# Patient Record
Sex: FEMALE | Race: WHITE | Employment: FULL TIME | URBAN - METROPOLITAN AREA
[De-identification: names, ages, dates, MRNs, and addresses within clinical notes are randomized per-mention and may not be internally consistent; named-entity substitution may affect disease eponyms.]

---

## 2017-01-25 RX ORDER — ACYCLOVIR 400 MG/1
TABLET ORAL
Qty: 60 TAB | Refills: 5 | Status: SHIPPED | OUTPATIENT
Start: 2017-01-25 | End: 2018-01-15 | Stop reason: SDUPTHER

## 2017-06-09 RX ORDER — ESTRADIOL 1 MG/1
TABLET ORAL
Qty: 30 TAB | Refills: 5 | Status: SHIPPED | OUTPATIENT
Start: 2017-06-09 | End: 2017-12-12 | Stop reason: SDUPTHER

## 2017-08-09 RX ORDER — GABAPENTIN 100 MG/1
CAPSULE ORAL
Qty: 60 CAP | Refills: 5 | Status: SHIPPED | OUTPATIENT
Start: 2017-08-09 | End: 2018-02-02 | Stop reason: ALTCHOICE

## 2017-09-14 RX ORDER — SIMVASTATIN 40 MG/1
TABLET, FILM COATED ORAL
Qty: 30 TAB | Refills: 5 | Status: SHIPPED | OUTPATIENT
Start: 2017-09-14 | End: 2018-11-19 | Stop reason: SDUPTHER

## 2017-12-12 RX ORDER — ESTRADIOL 1 MG/1
TABLET ORAL
Qty: 30 TAB | Refills: 5 | Status: SHIPPED | OUTPATIENT
Start: 2017-12-12 | End: 2018-03-11 | Stop reason: ALTCHOICE

## 2018-01-15 RX ORDER — ACYCLOVIR 400 MG/1
TABLET ORAL
Qty: 60 TAB | Refills: 5 | Status: SHIPPED | OUTPATIENT
Start: 2018-01-15 | End: 2019-01-22 | Stop reason: SDUPTHER

## 2018-01-26 ENCOUNTER — HOSPITAL ENCOUNTER (OUTPATIENT)
Dept: LAB | Age: 53
Discharge: HOME | End: 2018-01-26
Payer: COMMERCIAL

## 2018-01-26 ENCOUNTER — OFFICE VISIT (OUTPATIENT)
Dept: INTERNAL MEDICINE CLINIC | Age: 53
End: 2018-01-26

## 2018-01-26 VITALS
HEIGHT: 67 IN | RESPIRATION RATE: 16 BRPM | BODY MASS INDEX: 35.94 KG/M2 | WEIGHT: 229 LBS | SYSTOLIC BLOOD PRESSURE: 132 MMHG | TEMPERATURE: 98.4 F | HEART RATE: 76 BPM | DIASTOLIC BLOOD PRESSURE: 82 MMHG | OXYGEN SATURATION: 99 %

## 2018-01-26 DIAGNOSIS — Z00.00 ROUTINE CHECK-UP: Primary | ICD-10-CM

## 2018-01-26 DIAGNOSIS — E04.9 ENLARGED THYROID: ICD-10-CM

## 2018-01-26 DIAGNOSIS — R07.2 PRECORDIAL PAIN: ICD-10-CM

## 2018-01-26 DIAGNOSIS — R20.2 PARESTHESIA OF FOOT, BILATERAL: ICD-10-CM

## 2018-01-26 LAB
ALBUMIN SERPL-MCNC: 3.7 G/DL (ref 3.4–5)
ALBUMIN/GLOB SERPL: 0.9 {RATIO} (ref 0.8–1.7)
ALP SERPL-CCNC: 91 U/L (ref 45–117)
ALT SERPL-CCNC: 17 U/L (ref 13–56)
ANION GAP SERPL CALC-SCNC: 10 MMOL/L (ref 3–18)
AST SERPL-CCNC: 15 U/L (ref 15–37)
BASOPHILS # BLD: 0 K/UL (ref 0–0.06)
BASOPHILS NFR BLD: 0 % (ref 0–2)
BILIRUB SERPL-MCNC: 0.2 MG/DL (ref 0.2–1)
BUN SERPL-MCNC: 14 MG/DL (ref 7–18)
BUN/CREAT SERPL: 19 (ref 12–20)
CALCIUM SERPL-MCNC: 9.1 MG/DL (ref 8.5–10.1)
CHLORIDE SERPL-SCNC: 105 MMOL/L (ref 100–108)
CO2 SERPL-SCNC: 27 MMOL/L (ref 21–32)
CREAT SERPL-MCNC: 0.73 MG/DL (ref 0.6–1.3)
D DIMER PPP FEU-MCNC: 0.29 UG/ML(FEU)
DIFFERENTIAL METHOD BLD: NORMAL
EOSINOPHIL # BLD: 0.1 K/UL (ref 0–0.4)
EOSINOPHIL NFR BLD: 1 % (ref 0–5)
ERYTHROCYTE [DISTWIDTH] IN BLOOD BY AUTOMATED COUNT: 12.7 % (ref 11.6–14.5)
GLOBULIN SER CALC-MCNC: 3.9 G/DL (ref 2–4)
GLUCOSE SERPL-MCNC: 95 MG/DL (ref 74–99)
HCT VFR BLD AUTO: 42.6 % (ref 35–45)
HGB BLD-MCNC: 13.9 G/DL (ref 12–16)
LYMPHOCYTES # BLD: 2.8 K/UL (ref 0.9–3.6)
LYMPHOCYTES NFR BLD: 32 % (ref 21–52)
MCH RBC QN AUTO: 29.3 PG (ref 24–34)
MCHC RBC AUTO-ENTMCNC: 32.6 G/DL (ref 31–37)
MCV RBC AUTO: 89.9 FL (ref 74–97)
MONOCYTES # BLD: 0.4 K/UL (ref 0.05–1.2)
MONOCYTES NFR BLD: 4 % (ref 3–10)
NEUTS SEG # BLD: 5.3 K/UL (ref 1.8–8)
NEUTS SEG NFR BLD: 63 % (ref 40–73)
PLATELET # BLD AUTO: 384 K/UL (ref 135–420)
PMV BLD AUTO: 10.7 FL (ref 9.2–11.8)
POTASSIUM SERPL-SCNC: 4.8 MMOL/L (ref 3.5–5.5)
PROT SERPL-MCNC: 7.6 G/DL (ref 6.4–8.2)
RBC # BLD AUTO: 4.74 M/UL (ref 4.2–5.3)
SODIUM SERPL-SCNC: 142 MMOL/L (ref 136–145)
TSH SERPL DL<=0.05 MIU/L-ACNC: 2.06 UIU/ML (ref 0.36–3.74)
WBC # BLD AUTO: 8.6 K/UL (ref 4.6–13.2)

## 2018-01-26 PROCEDURE — 85025 COMPLETE CBC W/AUTO DIFF WBC: CPT | Performed by: INTERNAL MEDICINE

## 2018-01-26 PROCEDURE — 84443 ASSAY THYROID STIM HORMONE: CPT | Performed by: INTERNAL MEDICINE

## 2018-01-26 PROCEDURE — 85379 FIBRIN DEGRADATION QUANT: CPT | Performed by: INTERNAL MEDICINE

## 2018-01-26 PROCEDURE — 80053 COMPREHEN METABOLIC PANEL: CPT | Performed by: INTERNAL MEDICINE

## 2018-01-26 RX ORDER — CETIRIZINE HCL 10 MG
TABLET ORAL
COMMUNITY

## 2018-01-26 RX ORDER — PSEUDOEPHEDRINE HCL 30 MG
TABLET ORAL
COMMUNITY
End: 2018-03-11 | Stop reason: ALTCHOICE

## 2018-01-26 RX ORDER — PANTOPRAZOLE SODIUM 40 MG/1
TABLET, DELAYED RELEASE ORAL
Qty: 30 TAB | Refills: 1 | Status: SHIPPED | OUTPATIENT
Start: 2018-01-26 | End: 2018-03-09 | Stop reason: SDUPTHER

## 2018-01-26 RX ORDER — PREDNISONE 20 MG/1
TABLET ORAL
Qty: 20 TAB | Refills: 0 | Status: SHIPPED | OUTPATIENT
Start: 2018-01-26 | End: 2018-03-11 | Stop reason: ALTCHOICE

## 2018-01-26 NOTE — PATIENT INSTRUCTIONS
Health Maintenance Due   Topic    Hepatitis C Screening     DTaP/Tdap/Td series (1 - Tdap)    PAP AKA CERVICAL CYTOLOGY     FOBT Q 1 YEAR AGE 54-65     Influenza Age 5 to Adult     BREAST CANCER SCRN MAMMOGRAM

## 2018-01-26 NOTE — PROGRESS NOTES
The patient presents to the office today with the chief complaint of chest tightness    HPI    The patient complains of months of intermittent chest tightness. The patient relates tightness around her chest.  The patient also notes epigastric - lower chest pain. Stabbing in nature. Symptoms are not exertional.  The problem has been intermittent . Over time the pain has been lasting longer. The patient notes episodes of wheezing. A recent chest xray was reported to the patient as being negative. The patient complains of tingling in both feet. This has been present for 4 months. The patient's dentist has noticed what he felt was an enlarged thyroid      Review of Systems   Respiratory: Negative for shortness of breath. Cardiovascular: Negative for chest pain and leg swelling. No Known Allergies    Current Outpatient Prescriptions   Medication Sig Dispense Refill    cetirizine (ZYRTEC) 10 mg tablet Take  by mouth.  pseudoephedrine (SUDAFED) 30 mg tablet Take  by mouth every four (4) hours as needed for Congestion.  pantoprazole (PROTONIX) 40 mg tablet 1 tablet each AM 30 Tab 1    predniSONE (DELTASONE) 20 mg tablet 3 tabs daily x 3 days, 2 tabs daily x 3 days, 1 tab daily x 3 days, 1/2 tab daily x 3 days 20 Tab 0    acyclovir (ZOVIRAX) 400 mg tablet TAKE 1 TABLET BY MOUTH 2 TIMES A DAY 60 Tab 5    estradiol (ESTRACE) 1 mg tablet TAKE 1 TABLET BY MOUTH ONCE DAILY 30 Tab 5    simvastatin (ZOCOR) 40 mg tablet TAKE 1 TABLET BY MOUTH ONCE DAILY 30 Tab 5    gabapentin (NEURONTIN) 100 mg capsule TAKE 1-2 CAPSULES BY MOUTH EVERY NIGHT AT BEDTIME 60 Cap 5    albuterol (PROVENTIL HFA, VENTOLIN HFA, PROAIR HFA) 90 mcg/actuation inhaler Take 2 Puffs by inhalation every four (4) hours as needed for Wheezing.  1 Inhaler 0       Past Medical History:   Diagnosis Date    Allergic rhinitis, cause unspecified        Past Surgical History:   Procedure Laterality Date    HX HYSTERECTOMY         Social History     Social History    Marital status: SINGLE     Spouse name: N/A    Number of children: N/A    Years of education: N/A     Occupational History    Not on file. Social History Main Topics    Smoking status: Never Smoker    Smokeless tobacco: Never Used    Alcohol use Not on file    Drug use: No    Sexual activity: Yes     Partners: Male     Other Topics Concern    Not on file     Social History Narrative       Patient does not have an advanced directive on file    Visit Vitals    /82 (BP 1 Location: Left arm, BP Patient Position: Sitting)    Pulse 76    Temp 98.4 °F (36.9 °C) (Tympanic)    Resp 16    Ht 5' 7\" (1.702 m)    Wt 229 lb (103.9 kg)    SpO2 99%    BMI 35.87 kg/m2       Physical Exam   No Cervical Lymphadenopathy  No Supraclavicular Lymphadenopathy  Thyroid is Normal  Breasts are normal to inspection and palpation  Lungs are normal to percussion. Clear to auscultation   Heart:  S1 S2 are normal, No gallops, No mummers, no rubs  No Carotid Bruits  Abdomen:  Normal Bowel Sounds. No tenderness. No masses. No Hepatomegaly or Splenomegly  LE:  Strong Pedal Pulses. No Edema      BMI:  BMI is high but it was not addressed during this visit due to an acute problem      Hospital Outpatient Visit on 01/26/2018   Component Date Value Ref Range Status    D DIMER 01/26/2018 0.29  <0.46 ug/ml(FEU) Final    Comment: (NOTE)  A D-Dimer result less than 0.5 ug/mL FEU combined with a low clinical   pretest probability of DVT and/or PE has a negative predictive value   of %. The positive predictive value is 50% or less.       WBC 01/26/2018 8.6  4.6 - 13.2 K/uL Final    RBC 01/26/2018 4.74  4.20 - 5.30 M/uL Final    HGB 01/26/2018 13.9  12.0 - 16.0 g/dL Final    HCT 01/26/2018 42.6  35.0 - 45.0 % Final    MCV 01/26/2018 89.9  74.0 - 97.0 FL Final    MCH 01/26/2018 29.3  24.0 - 34.0 PG Final    MCHC 01/26/2018 32.6  31.0 - 37.0 g/dL Final    RDW 01/26/2018 12.7  11.6 - 14.5 % Final    PLATELET 91/96/3352 168  135 - 420 K/uL Final    MPV 01/26/2018 10.7  9.2 - 11.8 FL Final    NEUTROPHILS 01/26/2018 63  40 - 73 % Final    LYMPHOCYTES 01/26/2018 32  21 - 52 % Final    MONOCYTES 01/26/2018 4  3 - 10 % Final    EOSINOPHILS 01/26/2018 1  0 - 5 % Final    BASOPHILS 01/26/2018 0  0 - 2 % Final    ABS. NEUTROPHILS 01/26/2018 5.3  1.8 - 8.0 K/UL Final    ABS. LYMPHOCYTES 01/26/2018 2.8  0.9 - 3.6 K/UL Final    ABS. MONOCYTES 01/26/2018 0.4  0.05 - 1.2 K/UL Final    ABS. EOSINOPHILS 01/26/2018 0.1  0.0 - 0.4 K/UL Final    ABS. BASOPHILS 01/26/2018 0.0  0.0 - 0.06 K/UL Final    DF 01/26/2018 AUTOMATED    Final    Sodium 01/26/2018 142  136 - 145 mmol/L Final    Potassium 01/26/2018 4.8  3.5 - 5.5 mmol/L Final    Chloride 01/26/2018 105  100 - 108 mmol/L Final    CO2 01/26/2018 27  21 - 32 mmol/L Final    Anion gap 01/26/2018 10  3.0 - 18 mmol/L Final    Glucose 01/26/2018 95  74 - 99 mg/dL Final    BUN 01/26/2018 14  7.0 - 18 MG/DL Final    Creatinine 01/26/2018 0.73  0.6 - 1.3 MG/DL Final    BUN/Creatinine ratio 01/26/2018 19  12 - 20   Final    GFR est AA 01/26/2018 >60  >60 ml/min/1.73m2 Final    GFR est non-AA 01/26/2018 >60  >60 ml/min/1.73m2 Final    Comment: (NOTE)  Estimated GFR is calculated using the Modification of Diet in Renal   Disease (MDRD) Study equation, reported for both  Americans   (GFRAA) and non- Americans (GFRNA), and normalized to 1.73m2   body surface area. The physician must decide which value applies to   the patient. The MDRD study equation should only be used in   individuals age 25 or older. It has not been validated for the   following: pregnant women, patients with serious comorbid conditions,   or on certain medications, or persons with extremes of body size,   muscle mass, or nutritional status.       Calcium 01/26/2018 9.1  8.5 - 10.1 MG/DL Final    Bilirubin, total 01/26/2018 0.2  0.2 - 1.0 MG/DL Final    ALT (SGPT) 01/26/2018 17  13 - 56 U/L Final    AST (SGOT) 01/26/2018 15  15 - 37 U/L Final    Alk. phosphatase 01/26/2018 91  45 - 117 U/L Final    Protein, total 01/26/2018 7.6  6.4 - 8.2 g/dL Final    Albumin 01/26/2018 3.7  3.4 - 5.0 g/dL Final    Globulin 01/26/2018 3.9  2.0 - 4.0 g/dL Final    A-G Ratio 01/26/2018 0.9  0.8 - 1.7   Final    TSH 01/26/2018 2.06  0.36 - 3.74 uIU/mL Final       .  Results for orders placed or performed during the hospital encounter of 01/26/18   D DIMER   Result Value Ref Range    D DIMER 0.29 <0.46 ug/ml(FEU)   CBC WITH AUTOMATED DIFF   Result Value Ref Range    WBC 8.6 4.6 - 13.2 K/uL    RBC 4.74 4.20 - 5.30 M/uL    HGB 13.9 12.0 - 16.0 g/dL    HCT 42.6 35.0 - 45.0 %    MCV 89.9 74.0 - 97.0 FL    MCH 29.3 24.0 - 34.0 PG    MCHC 32.6 31.0 - 37.0 g/dL    RDW 12.7 11.6 - 14.5 %    PLATELET 807 857 - 445 K/uL    MPV 10.7 9.2 - 11.8 FL    NEUTROPHILS 63 40 - 73 %    LYMPHOCYTES 32 21 - 52 %    MONOCYTES 4 3 - 10 %    EOSINOPHILS 1 0 - 5 %    BASOPHILS 0 0 - 2 %    ABS. NEUTROPHILS 5.3 1.8 - 8.0 K/UL    ABS. LYMPHOCYTES 2.8 0.9 - 3.6 K/UL    ABS. MONOCYTES 0.4 0.05 - 1.2 K/UL    ABS. EOSINOPHILS 0.1 0.0 - 0.4 K/UL    ABS. BASOPHILS 0.0 0.0 - 0.06 K/UL    DF AUTOMATED     METABOLIC PANEL, COMPREHENSIVE   Result Value Ref Range    Sodium 142 136 - 145 mmol/L    Potassium 4.8 3.5 - 5.5 mmol/L    Chloride 105 100 - 108 mmol/L    CO2 27 21 - 32 mmol/L    Anion gap 10 3.0 - 18 mmol/L    Glucose 95 74 - 99 mg/dL    BUN 14 7.0 - 18 MG/DL    Creatinine 0.73 0.6 - 1.3 MG/DL    BUN/Creatinine ratio 19 12 - 20      GFR est AA >60 >60 ml/min/1.73m2    GFR est non-AA >60 >60 ml/min/1.73m2    Calcium 9.1 8.5 - 10.1 MG/DL    Bilirubin, total 0.2 0.2 - 1.0 MG/DL    ALT (SGPT) 17 13 - 56 U/L    AST (SGOT) 15 15 - 37 U/L    Alk.  phosphatase 91 45 - 117 U/L    Protein, total 7.6 6.4 - 8.2 g/dL    Albumin 3.7 3.4 - 5.0 g/dL    Globulin 3.9 2.0 - 4.0 g/dL    A-G Ratio 0.9 0.8 - 1.7     TSH 3RD GENERATION Result Value Ref Range    TSH 2.06 0.36 - 3.74 uIU/mL       Assessment / Plan      ICD-10-CM ICD-9-CM    1. Precordial pain R07.2 786.51 cetirizine (ZYRTEC) 10 mg tablet      pseudoephedrine (SUDAFED) 30 mg tablet      CBC WITH AUTOMATED DIFF      METABOLIC PANEL, COMPREHENSIVE      D DIMER      NUCLEAR STRESS TEST      CARDIAC SPECT REST AND STRESS   2. Enlarged thyroid E04.9 240.9 TSH 3RD GENERATION   3. Paresthesia of foot, bilateral R20.2 782.0        Labs  Dobutamine Nuclear Test  Empiric Protonix  Address enlarged thyroid and paresthesias once the chest pain has been addressed    Follow-up Disposition:  Return in about 2 weeks (around 2/9/2018). I asked Binu Oshea if she has any questions and I answered the questions.   Binu Oshea states that she understands the treatment plan and agrees with the treatment plan

## 2018-01-26 NOTE — PROGRESS NOTES
1. Have you been to the ER, urgent care clinic since your last visit? Hospitalized since your last visit? [facility] - Wed night / Chest tightness and SOB    2. Have you seen or consulted any other health care providers outside of the [address] [place] [address] since your last visit? Include any pap smears or colon screening.  No

## 2018-01-26 NOTE — MR AVS SNAPSHOT
[address] [place] [address], [facility] [address] [address] 
[phone] Patient: Santos Welch MRN: [id number] :1965 Visit Information Date & Time Provider Department Dept. Phone Encounter #  
 2018 12:45 PM Aimee Mercado MD [facility] INTERNAL MEDICINE OF Vanessa Forman [phone] [id number] Upcoming Health Maintenance Date Due Hepatitis C Screening 1965 DTaP/Tdap/Td series (1 - Tdap) 1985 PAP AKA CERVICAL CYTOLOGY 1985 FOBT Q 1 YEAR AGE 50-75 2014 Influenza Age 5 to Adult 2017 BREAST CANCER SCRN MAMMOGRAM 10/21/2017 Allergies as of 2018  Review Complete On: 2018 By: Aimee Mercado MD  
 No Known Allergies Current Immunizations  Never Reviewed No immunizations on file. Not reviewed this visit You Were Diagnosed With   
  
 Codes Comments Precordial pain    -  Primary ICD-10-CM: R07.2 ICD-9-CM: 786.51 Enlarged thyroid     ICD-10-CM: E04.9 ICD-9-CM: 240.9 Vitals BP Pulse Temp Resp Height(growth percentile) 132/82 (BP 1 Location: Left arm, BP Patient Position: Sitting) 76 98.4 °F (36.9 °C) (Tympanic) 16 5' 7\" (1.702 m) Weight(growth percentile) SpO2 BMI OB Status Smoking Status 229 lb (103.9 kg) 99% 35.87 kg/m2 Hysterectomy Never Smoker Vitals History BMI and BSA Data Body Mass Index Body Surface Area  
 35.87 kg/m 2 2.22 m 2 Preferred Pharmacy Pharmacy Name Phone [address], [address] [place] [address] [phone] Your Updated Medication List  
  
   
This list is accurate as of: 18  2:34 PM.  Always use your most recent med list.  
  
  
  
  
 acyclovir 400 mg tablet Commonly known as:  ZOVIRAX TAKE 1 TABLET BY MOUTH 2 TIMES A DAY  
  
 albuterol 90 mcg/actuation inhaler Commonly known as:  PROVENTIL HFA, VENTOLIN HFA, PROAIR HFA  
 Take 2 Puffs by inhalation every four (4) hours as needed for Wheezing. estradiol 1 mg tablet Commonly known as:  ESTRACE  
TAKE 1 TABLET BY MOUTH ONCE DAILY  
  
 gabapentin 100 mg capsule Commonly known as:  NEURONTIN  
TAKE 1-2 CAPSULES BY MOUTH EVERY NIGHT AT BEDTIME  
  
 pantoprazole 40 mg tablet Commonly known as:  PROTONIX  
1 tablet each AM  
  
 predniSONE 20 mg tablet Commonly known as:  DELTASONE  
3 tabs daily x 3 days, 2 tabs daily x 3 days, 1 tab daily x 3 days, 1/2 tab daily x 3 days  
  
 simvastatin 40 mg tablet Commonly known as:  ZOCOR  
TAKE 1 TABLET BY MOUTH ONCE DAILY SUDAFED 30 mg tablet Generic drug:  pseudoephedrine Take  by mouth every four (4) hours as needed for Congestion. ZyrTEC 10 mg tablet Generic drug:  cetirizine Take  by mouth. Prescriptions Sent to Pharmacy Refills  
 pantoprazole (PROTONIX) 40 mg tablet 1 Si tablet each AM  
 Class: Normal  
 Pharmacy: [id number] [place] [address], [address] [facility] Ph #: [phone]  
 predniSONE (DELTASONE) 20 mg tablet 0 Sig: 3 tabs daily x 3 days, 2 tabs daily x 3 days, 1 tab daily x 3 days, 1/2 tab daily x 3 days Class: Normal  
 Pharmacy: [id number] [place] [address], [facility] Ph #: [phone] To-Do List   
 2018 ECG:  CARDIAC SPECT REST AND STRESS   
  
 2018 ECG:  NUCLEAR STRESS TEST   
  
 2018 7:45 AM  
  Appointment with HBV NUC CARD ROOM at [facility] NON-INVASIVE CARD ([phone]) This is a 2-part test which takes approximately 4 hours to complete. Please see part 2 of exam below for full instructions 2018 8:15 AM  
  Appointment with HBV NUC CARD ROOM at [facility] NON-INVASIVE CARD ([phone]) 1-No eating or coffee after midnight  2-Do not take diabetic meds (bring with) 3-Please take all other meds unless specified by cardiology Referral Information Referral ID Referred By Referred To  
  
 [id number] Latia GUTIERREZ Not Available Visits Status Start Date End Date 1 New Request 1/26/18 1/26/19 If your referral has a status of pending review or denied, additional information will be sent to support the outcome of this decision. Referral ID Referred By Referred To  
 [id number] Latia GUTIERREZ Not Available Visits Status Start Date End Date 1 New Request 1/26/18 1/26/19 If your referral has a status of pending review or denied, additional information will be sent to support the outcome of this decision. Patient Instructions Health Maintenance Due Topic  Hepatitis C Screening  DTaP/Tdap/Td series (1 - Tdap)  PAP AKA CERVICAL CYTOLOGY  FOBT Q 1 YEAR AGE 50-75  Influenza Age 5 to Adult  BREAST CANCER SCRN MAMMOGRAM   
 
 
 
  
Introducing [facility]! Justice Dominguez introduces [employer] patient portal. Now you can access parts of your medical record, email your doctor's office, and request medication refills online. 1. In your internet browser, go to [email or web address] 2. Click on the First Time User? Click Here link in the Sign In box. You will see the New Member Sign Up page. 3. Enter your [employer] Access Code exactly as it appears below. You will not need to use this code after youve completed the sign-up process. If you do not sign up before the expiration date, you must request a new code. · [employer] Access Code: [email or web address] Expires: 4/26/2018  2:34 PM 
 
4. Enter the last four digits of your Social Security Number (xxxx) and Date of Birth (mm/dd/yyyy) as indicated and click Submit. You will be taken to the next sign-up page. 5. Create a [employer] ID. This will be your [employer] login ID and cannot be changed, so think of one that is secure and easy to remember. 6. Create a The [email or web address] password. You can change your password at any time. 7. Enter your Password Reset Question and Answer. This can be used at a later time if you forget your password. 8. Enter your e-mail address. You will receive e-mail notification when new information is available in [address] 9. Click Sign Up. You can now view and download portions of your medical record. 10. Click the Download Summary menu link to download a portable copy of your medical information. If you have questions, please visit the Frequently Asked Questions section of the [employer] website. Remember, [employer] is NOT to be used for urgent needs. For medical emergencies, dial 911. Now available from your iPhone and Android! Please provide this summary of care documentation to your next provider. Your primary care clinician is listed as Troy Milligan. If you have any questions after today's visit, please call [phone].

## 2018-01-30 ENCOUNTER — HOSPITAL ENCOUNTER (OUTPATIENT)
Dept: NON INVASIVE DIAGNOSTICS | Age: 53
Discharge: HOME | End: 2018-01-30
Attending: INTERNAL MEDICINE
Payer: COMMERCIAL

## 2018-01-30 DIAGNOSIS — R07.2 PRECORDIAL PAIN: ICD-10-CM

## 2018-01-30 LAB
ATTENDING PHYSICIAN, CST07: NORMAL
DIAGNOSIS, 93000: NORMAL
DUKE TM SCORE RESULT, CST14: NORMAL
DUKE TREADMILL SCORE, CST13: NORMAL
ECG INTERP BEFORE EX, CST11: NORMAL
ECG INTERP DURING EX, CST12: NORMAL
FUNCTIONAL CAPACITY, CST17: NORMAL
KNOWN CARDIAC CONDITION, CST08: NORMAL
MAX. DIASTOLIC BP, CST04: 62 MMHG
MAX. HEART RATE, CST05: 101 BPM
MAX. SYSTOLIC BP, CST03: 110 MMHG
OVERALL BP RESPONSE TO EXERCISE, CST16: NORMAL
OVERALL HR RESPONSE TO EXERCISE, CST15: NORMAL
PEAK EX METS, CST10: 1.5 METS
PROTOCOL NAME, CST01: NORMAL
TEST INDICATION, CST09: NORMAL

## 2018-01-30 PROCEDURE — 93017 CV STRESS TEST TRACING ONLY: CPT

## 2018-01-30 PROCEDURE — A9500 TC99M SESTAMIBI: HCPCS

## 2018-01-30 PROCEDURE — 78452 HT MUSCLE IMAGE SPECT MULT: CPT

## 2018-01-30 PROCEDURE — 74011250636 HC RX REV CODE- 250/636: Performed by: INTERNAL MEDICINE

## 2018-01-30 RX ORDER — SODIUM CHLORIDE 0.9 % (FLUSH) 0.9 %
10 SYRINGE (ML) INJECTION AS NEEDED
Status: COMPLETED | OUTPATIENT
Start: 2018-01-30 | End: 2018-01-30

## 2018-01-30 RX ADMIN — Medication 10 ML: at 08:45

## 2018-01-30 RX ADMIN — REGADENOSON 0.4 MG: 0.08 INJECTION, SOLUTION INTRAVENOUS at 08:45

## 2018-01-30 RX ADMIN — Medication 10 ML: at 07:40

## 2018-01-30 NOTE — PROCEDURES
[address]  MR#: [id number]  : 1965  ACCOUNT #: [de-identified]   DATE OF SERVICE: 2018    PROCEDURE PERFORMED:  Pharmacologic nuclear scan. Baseline electrocardiogram shows normal sinus rhythm with subtle inferior ST depression. DESCRIPTION OF PROCEDURE:  Patient has an IV in the left arm. She received Lexiscan per protocol. She tolerated the procedure well. No chest pain was noted. She received resting dose of sestamibi 10.9 mCi at 7:40 a.m. She received stress dose of sestamibi 33.0 mCi at 8:45 a.m. TREADMILL FINDINGS:  1.  ST segment changes:  Less than 0.5 mm ST depression inferolaterally at peak exercise. 2.  Dysrhythmia:  None. 3.  Chest pain:  None. 4.  Both heart rate and blood pressure response are normal.    TREADMILL CONCLUSION:  This is a negative pharmacologic stress test from electrocardiographic standpoint. MYOCARDIAL NUCLEAR PERFUSION STUDY FINDINGS:  1. Perfusion imaging shows no evidence of significant ongoing ischemia or prior infarction. 2.  Gated SPECT imaging shows normal left ventricular chamber size and function with estimated ejection fraction of 65%. No obvious regional wall motion abnormalities noted. CONCLUSIONS:  1.  Negative pharmacologic stress test from electrocardiographic standpoint. 2.  Normal perfusion study. 3.  Perfusion imaging shows no evidence of significant ongoing ischemia or prior infarction. 4.  Gated SPECT imaging shows normal left ventricular chamber size and function with estimated ejection fraction of 65%. No obvious regional wall motion bodies noted. 5.  This is a low risk finding.       Negrita Torres MD       [facility]  D: 2018 12:44     T: 2018 13:04  JOB #: [id number]  CC: Fadumo Burdick MD

## 2018-01-30 NOTE — PROGRESS NOTES
Patient was injected with 50.2 millicuries 70OPA Sestamibi on 1/30/18 at 0740. Patient was injected with 16.6 millicuries 25SZG Sestamibi on 1/30/18 at 7 [address] . Patient's armbands were removed and placed in shred-it box.     Patient had a Nuclear Lexiscan Stress Test.

## 2018-02-02 ENCOUNTER — OFFICE VISIT (OUTPATIENT)
Dept: INTERNAL MEDICINE CLINIC | Age: 53
End: 2018-02-02

## 2018-02-02 VITALS
BODY MASS INDEX: 35.94 KG/M2 | DIASTOLIC BLOOD PRESSURE: 64 MMHG | RESPIRATION RATE: 16 BRPM | HEART RATE: 68 BPM | HEIGHT: 67 IN | SYSTOLIC BLOOD PRESSURE: 114 MMHG | TEMPERATURE: 98.4 F | WEIGHT: 229 LBS | OXYGEN SATURATION: 97 %

## 2018-02-02 DIAGNOSIS — R07.2 PRECORDIAL PAIN: Primary | ICD-10-CM

## 2018-02-02 DIAGNOSIS — R20.2 PARESTHESIA OF FOOT, BILATERAL: ICD-10-CM

## 2018-02-02 RX ORDER — GABAPENTIN 300 MG/1
300 CAPSULE ORAL 3 TIMES DAILY
Qty: 30 CAP | Refills: 3 | Status: SHIPPED | OUTPATIENT
Start: 2018-02-02 | End: 2018-04-19 | Stop reason: SDUPTHER

## 2018-02-02 NOTE — PROGRESS NOTES
1. Have you been to the ER, urgent care clinic since your last visit? Hospitalized since your last visit? No    2. Have you seen or consulted any other health care providers outside of the [address] [place] [address] since your last visit? Include any pap smears or colon screening.  No

## 2018-02-02 NOTE — PATIENT INSTRUCTIONS
Health Maintenance Due   Topic Date Due    Hepatitis C Test  1965    DTaP/Tdap/Td  (1 - Tdap) 05/26/1985    Cervical Cancer Screening  05/26/1985    Stool testing for trace blood  05/26/2014    Flu Vaccine  08/01/2017    Breast Cancer Screening  10/21/2017

## 2018-02-02 NOTE — MR AVS SNAPSHOT
[address] 
 
 
 [address] [place] [address] 
[phone] Patient: Binu Oshea MRN: [id number] :1965 Visit Information Date & Time Provider Department Dept. Phone Encounter #  
 2018  2:45 PM Maurisio Cage MD [facility] INTERNAL MEDICINE OF Amberly Garces [phone] [id number] Follow-up Instructions Return in about 6 weeks (around 3/16/2018). Your Appointments 3/9/2018  4:00 PM  
Follow Up with Maurisio Cage MD  
[address] [facility]) Appt Note: 6WK  
 [address] [place] [address]  
  
   
 [address], [address] [place] [address] Upcoming Health Maintenance Date Due Hepatitis C Screening 1965 DTaP/Tdap/Td series (1 - Tdap) 1985 PAP AKA CERVICAL CYTOLOGY 1985 FOBT Q 1 YEAR AGE 50-75 2014 Influenza Age 5 to Adult 2017 BREAST CANCER SCRN MAMMOGRAM 10/21/2017 Allergies as of 2018  Review Complete On: 2018 By: Esa , LPN Severity Noted Reaction Type Reactions Latex  2018    Hives Fruit C [Ascorbic Acid (Vitamin C)]  2018    Hives Bananas, apples, watermelon Current Immunizations  Never Reviewed No immunizations on file. Not reviewed this visit Vitals BP Pulse Temp Resp Height(growth percentile) 114/64 (BP 1 Location: Left arm, BP Patient Position: Sitting) 68 98.4 °F (36.9 °C) (Tympanic) 16 5' 7\" (1.702 m) Weight(growth percentile) SpO2 BMI OB Status Smoking Status 229 lb (103.9 kg) 97% 35.87 kg/m2 Hysterectomy Never Smoker BMI and BSA Data Body Mass Index Body Surface Area  
 35.87 kg/m 2 2.22 m 2 Preferred Pharmacy Pharmacy Name Phone [address], [address] [place] [address] [phone] Your Updated Medication List  
  
   
 This list is accurate as of: 2/2/18  4:00 PM.  Always use your most recent med list.  
  
  
  
  
 acyclovir 400 mg tablet Commonly known as:  ZOVIRAX TAKE 1 TABLET BY MOUTH 2 TIMES A DAY  
  
 albuterol 90 mcg/actuation inhaler Commonly known as:  PROVENTIL HFA, VENTOLIN HFA, PROAIR HFA Take 2 Puffs by inhalation every four (4) hours as needed for Wheezing. estradiol 1 mg tablet Commonly known as:  ESTRACE  
TAKE 1 TABLET BY MOUTH ONCE DAILY  
  
 gabapentin 300 mg capsule Commonly known as:  NEURONTIN Take 1 Cap by mouth three (3) times daily. pantoprazole 40 mg tablet Commonly known as:  PROTONIX  
1 tablet each AM  
  
 predniSONE 20 mg tablet Commonly known as:  DELTASONE  
3 tabs daily x 3 days, 2 tabs daily x 3 days, 1 tab daily x 3 days, 1/2 tab daily x 3 days  
  
 simvastatin 40 mg tablet Commonly known as:  ZOCOR  
TAKE 1 TABLET BY MOUTH ONCE DAILY SUDAFED 30 mg tablet Generic drug:  pseudoephedrine Take  by mouth every four (4) hours as needed for Congestion. ZyrTEC 10 mg tablet Generic drug:  cetirizine Take  by mouth. Prescriptions Sent to Pharmacy Refills  
 gabapentin (NEURONTIN) 300 mg capsule 3 Sig: Take 1 Cap by mouth three (3) times daily. Class: Normal  
 Pharmacy: [address] [place] [address] #: [phone] Route: Oral  
  
Follow-up Instructions Return in about 6 weeks (around 3/16/2018). Patient Instructions Health Maintenance Due Topic Date Due  
 Hepatitis C Test  1965  DTaP/Tdap/Td  (1 - Tdap) 05/26/1985  Cervical Cancer Screening  05/26/1985  Stool testing for trace blood  05/26/2014  Flu Vaccine  08/01/2017  Breast Cancer Screening  10/21/2017 Introducing [facility]!    
 [employer] introduces [employer] patient portal. Now you can access parts of your medical record, email your doctor's office, and request medication refills online. 1. In your internet browser, go to [email or web address] 2. Click on the First Time User? Click Here link in the Sign In box. You will see the New Member Sign Up page. 3. Enter your pickrset Access Code exactly as it appears below. You will not need to use this code after youve completed the sign-up process. If you do not sign up before the expiration date, you must request a new code. · [employer]set Access Code: [email or web address] Expires: 4/26/2018  2:34 PM 
 
4. Enter the last four digits of your Social Security Number (xxxx) and Date of Birth (mm/dd/yyyy) as indicated and click Submit. You will be taken to the next sign-up page. 5. Create a pickrset ID. This will be your pickrset login ID and cannot be changed, so think of one that is secure and easy to remember. 6. Create a pickrset password. You can change your password at any time. 7. Enter your Password Reset Question and Answer. This can be used at a later time if you forget your password. 8. Enter your e-mail address. You will receive e-mail notification when new information is available in [address] 9. Click Sign Up. You can now view and download portions of your medical record. 10. Click the Download Summary menu link to download a portable copy of your medical information. If you have questions, please visit the Frequently Asked Questions section of the pickrset website. Remember, [employer]set is NOT to be used for urgent needs. For medical emergencies, dial 911. Now available from your iPhone and Android! Please provide this summary of care documentation to your next provider. Your primary care clinician is listed as Maryann Velasco. If you have any questions after today's visit, please call [phone].

## 2018-02-03 NOTE — PROGRESS NOTES
The patient presents to the office today with the chief complaint of chest pain    HPI    The patient persists with substernal discomfort. Not exertional.  The patient has become less frequent and less severe. A recent nuclear stress was negative. The patient persists with paresthesias in both feet. Review of Systems   Respiratory: Negative for shortness of breath. Cardiovascular: Positive for chest pain. Negative for leg swelling. Neurological: Positive for tingling. Allergies   Allergen Reactions    Latex Hives    Fruit C [Ascorbic Acid (Vitamin C)] Hives     Bananas, apples, watermelon       Current Outpatient Prescriptions   Medication Sig Dispense Refill    gabapentin (NEURONTIN) 300 mg capsule Take 1 Cap by mouth three (3) times daily. 30 Cap 3    cetirizine (ZYRTEC) 10 mg tablet Take  by mouth.  pseudoephedrine (SUDAFED) 30 mg tablet Take  by mouth every four (4) hours as needed for Congestion.  pantoprazole (PROTONIX) 40 mg tablet 1 tablet each AM 30 Tab 1    predniSONE (DELTASONE) 20 mg tablet 3 tabs daily x 3 days, 2 tabs daily x 3 days, 1 tab daily x 3 days, 1/2 tab daily x 3 days 20 Tab 0    acyclovir (ZOVIRAX) 400 mg tablet TAKE 1 TABLET BY MOUTH 2 TIMES A DAY 60 Tab 5    estradiol (ESTRACE) 1 mg tablet TAKE 1 TABLET BY MOUTH ONCE DAILY 30 Tab 5    simvastatin (ZOCOR) 40 mg tablet TAKE 1 TABLET BY MOUTH ONCE DAILY 30 Tab 5    albuterol (PROVENTIL HFA, VENTOLIN HFA, PROAIR HFA) 90 mcg/actuation inhaler Take 2 Puffs by inhalation every four (4) hours as needed for Wheezing. 1 Inhaler 0       Past Medical History:   Diagnosis Date    Allergic rhinitis, cause unspecified        Past Surgical History:   Procedure Laterality Date    HX HYSTERECTOMY         Social History     Social History    Marital status: SINGLE     Spouse name: N/A    Number of children: N/A    Years of education: N/A     Occupational History    Not on file.      Social History Main Topics    Smoking status: Never Smoker    Smokeless tobacco: Never Used    Alcohol use Not on file    Drug use: No    Sexual activity: Yes     Partners: Male     Other Topics Concern    Not on file     Social History Narrative       Patient does not have an advanced directive on file    Visit Vitals    /64 (BP 1 Location: Left arm, BP Patient Position: Sitting)    Pulse 68    Temp 98.4 °F (36.9 °C) (Tympanic)    Resp 16    Ht 5' 7\" (1.702 m)    Wt 229 lb (103.9 kg)    SpO2 97%    BMI 35.87 kg/m2       Physical Exam   No Cervical Lymphadenopathy  No Supraclavicular Lymphadenopathy  Thyroid is Normal  Lungs are normal to percussion. Clear to auscultation   Heart:  S1 S2 are normal, No gallops, No mummers  No Carotid Bruits  Abdomen:  Normal Bowel Sounds. No tenderness. No masses. No Hepatomegaly or Splenomegly  LE:  Strong Pedal Pulses. No Edema      BMI:  [facility] Outpatient Visit on 01/30/2018   Component Date Value Ref Range Status    Test indication 01/30/2018 Chest Discomfort   Preliminary    Functional capacity 01/30/2018 Could Not Be Adequately Assessed   Preliminary    ECG Interp. Before Exercise 01/30/2018 Normal Sinus Rhythm   Preliminary    ECG Interp. During Exercise 01/30/2018 none   Preliminary    Max. Systolic BP 84/45/3988 679  mmHg Preliminary    Max. Diastolic BP 80/46/1954 62  mmHg Preliminary    Max. Heart rate 01/30/2018 101  BPM Preliminary    Peak Ex METs 01/30/2018 1.5  METS Preliminary    Protocol name 01/30/2018 [facility] Outpatient Visit on 01/26/2018   Component Date Value Ref Range Status    D DIMER 01/26/2018 0.29  <0.46 ug/ml(FEU) Final    Comment: (NOTE)  A D-Dimer result less than 0.5 ug/mL FEU combined with a low clinical   pretest probability of DVT and/or PE has a negative predictive value   of %. The positive predictive value is 50% or less.       WBC 01/26/2018 8.6  4.6 - 13.2 K/uL Final    RBC 01/26/2018 4.74 4.20 - 5.30 M/uL Final    HGB 01/26/2018 13.9  12.0 - 16.0 g/dL Final    HCT 01/26/2018 42.6  35.0 - 45.0 % Final    MCV 01/26/2018 89.9  74.0 - 97.0 FL Final    MCH 01/26/2018 29.3  24.0 - 34.0 PG Final    MCHC 01/26/2018 32.6  31.0 - 37.0 g/dL Final    RDW 01/26/2018 12.7  11.6 - 14.5 % Final    PLATELET 22/50/5708 226  135 - 420 K/uL Final    MPV 01/26/2018 10.7  9.2 - 11.8 FL Final    NEUTROPHILS 01/26/2018 63  40 - 73 % Final    LYMPHOCYTES 01/26/2018 32  21 - 52 % Final    MONOCYTES 01/26/2018 4  3 - 10 % Final    EOSINOPHILS 01/26/2018 1  0 - 5 % Final    BASOPHILS 01/26/2018 0  0 - 2 % Final    ABS. NEUTROPHILS 01/26/2018 5.3  1.8 - 8.0 K/UL Final    ABS. LYMPHOCYTES 01/26/2018 2.8  0.9 - 3.6 K/UL Final    ABS. MONOCYTES 01/26/2018 0.4  0.05 - 1.2 K/UL Final    ABS. EOSINOPHILS 01/26/2018 0.1  0.0 - 0.4 K/UL Final    ABS. BASOPHILS 01/26/2018 0.0  0.0 - 0.06 K/UL Final    DF 01/26/2018 AUTOMATED    Final    Sodium 01/26/2018 142  136 - 145 mmol/L Final    Potassium 01/26/2018 4.8  3.5 - 5.5 mmol/L Final    Chloride 01/26/2018 105  100 - 108 mmol/L Final    CO2 01/26/2018 27  21 - 32 mmol/L Final    Anion gap 01/26/2018 10  3.0 - 18 mmol/L Final    Glucose 01/26/2018 95  74 - 99 mg/dL Final    BUN 01/26/2018 14  7.0 - 18 MG/DL Final    Creatinine 01/26/2018 0.73  0.6 - 1.3 MG/DL Final    BUN/Creatinine ratio 01/26/2018 19  12 - 20   Final    GFR est AA 01/26/2018 >60  >60 ml/min/1.73m2 Final    GFR est non-AA 01/26/2018 >60  >60 ml/min/1.73m2 Final    Comment: (NOTE)  Estimated GFR is calculated using the Modification of Diet in Renal   Disease (MDRD) Study equation, reported for both  Americans   (GFRAA) and non- Americans (GFRNA), and normalized to 1.73m2   body surface area. The physician must decide which value applies to   the patient. The MDRD study equation should only be used in   individuals age 25 or older.  It has not been validated for the   following: pregnant women, patients with serious comorbid conditions,   or on certain medications, or persons with extremes of body size,   muscle mass, or nutritional status.  Calcium 01/26/2018 9.1  8.5 - 10.1 MG/DL Final    Bilirubin, total 01/26/2018 0.2  0.2 - 1.0 MG/DL Final    ALT (SGPT) 01/26/2018 17  13 - 56 U/L Final    AST (SGOT) 01/26/2018 15  15 - 37 U/L Final    Alk. phosphatase 01/26/2018 91  45 - 117 U/L Final    Protein, total 01/26/2018 7.6  6.4 - 8.2 g/dL Final    Albumin 01/26/2018 3.7  3.4 - 5.0 g/dL Final    Globulin 01/26/2018 3.9  2.0 - 4.0 g/dL Final    A-G Ratio 01/26/2018 0.9  0.8 - 1.7   Final    TSH 01/26/2018 2.06  0.36 - 3.74 uIU/mL Final       .No results found for any visits on 02/02/18. Assessment / Plan      ICD-10-CM ICD-9-CM    1. Precordial pain R07.2 786.51    2. Paresthesia of foot, bilateral R20.2 782.0        she was advised to continue her maintenance medications  Consider Vitamin B12 1000 ug daily    Follow-up Disposition:  Return in about 6 weeks (around 3/16/2018). I asked Ben Moeller if she has any questions and I answered the questions.   Ben Moeller states that she understands the treatment plan and agrees with the treatment plan

## 2018-03-09 ENCOUNTER — OFFICE VISIT (OUTPATIENT)
Dept: INTERNAL MEDICINE CLINIC | Age: 53
End: 2018-03-09

## 2018-03-09 VITALS
BODY MASS INDEX: 36.1 KG/M2 | RESPIRATION RATE: 16 BRPM | OXYGEN SATURATION: 98 % | TEMPERATURE: 98.4 F | WEIGHT: 230 LBS | SYSTOLIC BLOOD PRESSURE: 112 MMHG | DIASTOLIC BLOOD PRESSURE: 70 MMHG | HEIGHT: 67 IN | HEART RATE: 79 BPM

## 2018-03-09 DIAGNOSIS — R05.9 COUGH: Primary | ICD-10-CM

## 2018-03-09 DIAGNOSIS — J06.9 URI, ACUTE: ICD-10-CM

## 2018-03-09 DIAGNOSIS — R07.2 PRECORDIAL PAIN: ICD-10-CM

## 2018-03-09 RX ORDER — PANTOPRAZOLE SODIUM 40 MG/1
TABLET, DELAYED RELEASE ORAL
Qty: 30 TAB | Refills: 1 | Status: SHIPPED | OUTPATIENT
Start: 2018-03-09 | End: 2018-06-19 | Stop reason: SDUPTHER

## 2018-03-09 RX ORDER — CODEINE PHOSPHATE AND GUAIFENESIN 10; 100 MG/5ML; MG/5ML
SOLUTION ORAL
Qty: 240 ML | Refills: 1 | Status: SHIPPED | OUTPATIENT
Start: 2018-03-09 | End: 2019-04-08 | Stop reason: ALTCHOICE

## 2018-03-09 NOTE — PROGRESS NOTES
The patient presents to the office today with the chief complaint of cough    HPI    The patient had a recent URI. She is doing better but she has a cough. The cough is improving but it persists. The patient has chest pain but this has greatly improved. The patient denies fever      Review of Systems   Respiratory: Negative for shortness of breath. Cardiovascular: Negative for chest pain and leg swelling. Allergies   Allergen Reactions    Latex Hives    Fruit C [Ascorbic Acid (Vitamin C)] Hives     Bananas, apples, watermelon       Current Outpatient Prescriptions   Medication Sig Dispense Refill    guaiFENesin-codeine (CHERATUSSIN AC) 100-10 mg/5 mL solution 3 tabs daily x 3 days, 2 tabs daily x 3 days, 1 tab daily x 3 days, 1/2 tab daily x 3 days 240 mL 1    pantoprazole (PROTONIX) 40 mg tablet 1 tablet each AM 30 Tab 1    gabapentin (NEURONTIN) 300 mg capsule Take 1 Cap by mouth three (3) times daily. 30 Cap 3    cetirizine (ZYRTEC) 10 mg tablet Take  by mouth.  acyclovir (ZOVIRAX) 400 mg tablet TAKE 1 TABLET BY MOUTH 2 TIMES A DAY 60 Tab 5    simvastatin (ZOCOR) 40 mg tablet TAKE 1 TABLET BY MOUTH ONCE DAILY 30 Tab 5    albuterol (PROVENTIL HFA, VENTOLIN HFA, PROAIR HFA) 90 mcg/actuation inhaler Take 2 Puffs by inhalation every four (4) hours as needed for Wheezing. 1 Inhaler 0       Past Medical History:   Diagnosis Date    Allergic rhinitis, cause unspecified        Past Surgical History:   Procedure Laterality Date    HX HYSTERECTOMY         Social History     Social History    Marital status: SINGLE     Spouse name: N/A    Number of children: N/A    Years of education: N/A     Occupational History    Not on file.      Social History Main Topics    Smoking status: Never Smoker    Smokeless tobacco: Never Used    Alcohol use Not on file    Drug use: No    Sexual activity: Yes     Partners: Male     Other Topics Concern    Not on file     Social History Narrative Patient does not have an advanced directive on file    Visit Vitals    /70 (BP 1 Location: Left arm, BP Patient Position: Sitting)    Pulse 79    Temp 98.4 °F (36.9 °C) (Tympanic)    Resp 16    Ht 5' 7\" (1.702 m)    Wt 230 lb (104.3 kg)    SpO2 98%    BMI 36.02 kg/m2       Physical Exam   Cardiovascular: Normal rate and regular rhythm. Exam reveals no gallop. No murmur heard. Pulmonary/Chest: She has no wheezes. She has no rales. She exhibits no tenderness. Abdominal: Soft. She exhibits no distension. There is no tenderness. Musculoskeletal: She exhibits no edema. BMI:  The patient was advised to limit calories to 1800 shirin and carbohydrates to 100 grams daily    Hospital Outpatient Visit on 01/30/2018   Component Date Value Ref Range Status    Test indication 01/30/2018 Chest Discomfort   Preliminary    Functional capacity 01/30/2018 Could Not Be Adequately Assessed   Preliminary    ECG Interp. Before Exercise 01/30/2018 Normal Sinus Rhythm   Preliminary    ECG Interp. During Exercise 01/30/2018 none   Preliminary    Max. Systolic BP 03/03/0526 075  mmHg Preliminary    Max. Diastolic BP 69/82/2622 62  mmHg Preliminary    Max. Heart rate 01/30/2018 101  BPM Preliminary    Peak Ex METs 01/30/2018 1.5  METS Preliminary    Protocol name 01/30/2018 [facility] Outpatient Visit on 01/26/2018   Component Date Value Ref Range Status    D DIMER 01/26/2018 0.29  <0.46 ug/ml(FEU) Final    Comment: (NOTE)  A D-Dimer result less than 0.5 ug/mL FEU combined with a low clinical   pretest probability of DVT and/or PE has a negative predictive value   of %. The positive predictive value is 50% or less.       WBC 01/26/2018 8.6  4.6 - 13.2 K/uL Final    RBC 01/26/2018 4.74  4.20 - 5.30 M/uL Final    HGB 01/26/2018 13.9  12.0 - 16.0 g/dL Final    HCT 01/26/2018 42.6  35.0 - 45.0 % Final    MCV 01/26/2018 89.9  74.0 - 97.0 FL Final    [facility] 01/26/2018 29.3  24.0 - 34.0 PG Final    MCHC 01/26/2018 32.6  31.0 - 37.0 g/dL Final    RDW 01/26/2018 12.7  11.6 - 14.5 % Final    PLATELET 83/55/7212 538  135 - 420 K/uL Final    MPV 01/26/2018 10.7  9.2 - 11.8 FL Final    NEUTROPHILS 01/26/2018 63  40 - 73 % Final    LYMPHOCYTES 01/26/2018 32  21 - 52 % Final    MONOCYTES 01/26/2018 4  3 - 10 % Final    EOSINOPHILS 01/26/2018 1  0 - 5 % Final    BASOPHILS 01/26/2018 0  0 - 2 % Final    ABS. NEUTROPHILS 01/26/2018 5.3  1.8 - 8.0 K/UL Final    ABS. LYMPHOCYTES 01/26/2018 2.8  0.9 - 3.6 K/UL Final    ABS. MONOCYTES 01/26/2018 0.4  0.05 - 1.2 K/UL Final    ABS. EOSINOPHILS 01/26/2018 0.1  0.0 - 0.4 K/UL Final    ABS. BASOPHILS 01/26/2018 0.0  0.0 - 0.06 K/UL Final    DF 01/26/2018 AUTOMATED    Final    Sodium 01/26/2018 142  136 - 145 mmol/L Final    Potassium 01/26/2018 4.8  3.5 - 5.5 mmol/L Final    Chloride 01/26/2018 105  100 - 108 mmol/L Final    CO2 01/26/2018 27  21 - 32 mmol/L Final    Anion gap 01/26/2018 10  3.0 - 18 mmol/L Final    Glucose 01/26/2018 95  74 - 99 mg/dL Final    BUN 01/26/2018 14  7.0 - 18 MG/DL Final    Creatinine 01/26/2018 0.73  0.6 - 1.3 MG/DL Final    BUN/Creatinine ratio 01/26/2018 19  12 - 20   Final    GFR est AA 01/26/2018 >60  >60 ml/min/1.73m2 Final    GFR est non-AA 01/26/2018 >60  >60 ml/min/1.73m2 Final    Comment: (NOTE)  Estimated GFR is calculated using the Modification of Diet in Renal   Disease (MDRD) Study equation, reported for both  Americans   (GFRAA) and non- Americans (GFRNA), and normalized to 1.73m2   body surface area. The physician must decide which value applies to   the patient. The MDRD study equation should only be used in   individuals age 25 or older. It has not been validated for the   following: pregnant women, patients with serious comorbid conditions,   or on certain medications, or persons with extremes of body size,   muscle mass, or nutritional status.       Calcium 01/26/2018 9.1  8.5 - 10.1 MG/DL Final    Bilirubin, total 01/26/2018 0.2  0.2 - 1.0 MG/DL Final    ALT (SGPT) 01/26/2018 17  13 - 56 U/L Final    AST (SGOT) 01/26/2018 15  15 - 37 U/L Final    Alk. phosphatase 01/26/2018 91  45 - 117 U/L Final    Protein, total 01/26/2018 7.6  6.4 - 8.2 g/dL Final    Albumin 01/26/2018 3.7  3.4 - 5.0 g/dL Final    Globulin 01/26/2018 3.9  2.0 - 4.0 g/dL Final    A-G Ratio 01/26/2018 0.9  0.8 - 1.7   Final    TSH 01/26/2018 2.06  0.36 - 3.74 uIU/mL Final       .No results found for any visits on 03/09/18. Assessment / Plan      ICD-10-CM ICD-9-CM    1. Cough R05 786.2 guaiFENesin-codeine (CHERATUSSIN AC) 100-10 mg/5 mL solution   2. URI, acute J06.9 465.9    3. Precordial pain R07.2 786.51        Cheratussin AC  she was advised to continue her maintenance medications  she is to call if symptoms persist over 7 days      Follow-up Disposition:  Return in about 6 months (around 9/9/2018). I asked Wyjason Herve if she has any questions and I answered the questions.   Nicol Edgar states that she understands the treatment plan and agrees with the treatment plan

## 2018-03-09 NOTE — MR AVS SNAPSHOT
[address] [place] [address], [address] [place] [id number] 
[phone] Patient: Tova Kelley MRN: [id number] :1965 Visit Information Date & Time Provider Department Dept. Phone Encounter #  
 3/9/2018  4:00 PM Trixie Bansal MD [facility] [phone] [id number] Upcoming Health Maintenance Date Due Hepatitis C Screening 1965 DTaP/Tdap/Td series (1 - Tdap) 1985 PAP AKA CERVICAL CYTOLOGY 1985 FOBT Q 1 YEAR AGE 50-75 2014 BREAST CANCER SCRN MAMMOGRAM 10/21/2017 Allergies as of 3/9/2018  Review Complete On: 3/9/2018 By: Trixie Bansal MD  
  
 Severity Noted Reaction Type Reactions Latex  2018    Hives Fruit C [Ascorbic Acid (Vitamin C)]  2018    Hives Bananas, apples, watermelon Current Immunizations  Never Reviewed No immunizations on file. Not reviewed this visit You Were Diagnosed With   
  
 Codes Comments Cough    -  Primary ICD-10-CM: V39 ICD-9-CM: 199. 2 Vitals BP Pulse Temp Resp Height(growth percentile) 112/70 (BP 1 Location: Left arm, BP Patient Position: Sitting) 79 98.4 °F (36.9 °C) (Tympanic) 16 5' 7\" (1.702 m) Weight(growth percentile) SpO2 BMI OB Status Smoking Status 230 lb (104.3 kg) 98% 36.02 kg/m2 Hysterectomy Never Smoker BMI and BSA Data Body Mass Index Body Surface Area 36.02 kg/m 2 2.22 m 2 Preferred Pharmacy Pharmacy Name Phone [address], [address] [place] [address] [phone] Your Updated Medication List  
  
   
This list is accurate as of 3/9/18  4:53 PM.  Always use your most recent med list.  
  
  
  
  
 acyclovir 400 mg tablet Commonly known as:  ZOVIRAX TAKE 1 TABLET BY MOUTH 2 TIMES A DAY  
  
 albuterol 90 mcg/actuation inhaler Commonly known as:  PROVENTIL HFA, VENTOLIN HFA, PROAIR HFA  
 Take 2 Puffs by inhalation every four (4) hours as needed for Wheezing. estradiol 1 mg tablet Commonly known as:  ESTRACE  
TAKE 1 TABLET BY MOUTH ONCE DAILY  
  
 gabapentin 300 mg capsule Commonly known as:  NEURONTIN Take 1 Cap by mouth three (3) times daily. guaiFENesin-codeine 100-10 mg/5 mL solution Commonly known as:  CHERATUSSIN AC  
3 tabs daily x 3 days, 2 tabs daily x 3 days, 1 tab daily x 3 days, 1/2 tab daily x 3 days  
  
 pantoprazole 40 mg tablet Commonly known as:  PROTONIX  
1 tablet each AM  
  
 predniSONE 20 mg tablet Commonly known as:  DELTASONE  
3 tabs daily x 3 days, 2 tabs daily x 3 days, 1 tab daily x 3 days, 1/2 tab daily x 3 days  
  
 simvastatin 40 mg tablet Commonly known as:  ZOCOR  
TAKE 1 TABLET BY MOUTH ONCE DAILY SUDAFED 30 mg tablet Generic drug:  pseudoephedrine Take  by mouth every four (4) hours as needed for Congestion. ZyrTEC 10 mg tablet Generic drug:  cetirizine Take  by mouth. Prescriptions Printed Refills  
 guaiFENesin-codeine (CHERATUSSIN AC) 100-10 mg/5 mL solution 1 Sig: 3 tabs daily x 3 days, 2 tabs daily x 3 days, 1 tab daily x 3 days, 1/2 tab daily x 3 days Class: Print Prescriptions Sent to Pharmacy Refills  
 pantoprazole (PROTONIX) 40 mg tablet 1 Si tablet each AM  
 Class: Normal  
 Pharmacy: [address] [place] [address], [facility] #: [phone] Introducing [facility]! Lucien Farooq introduces [employer] patient portal. Now you can access parts of your medical record, email your doctor's office, and request medication refills online. 1. In your internet browser, go to [email or web address] 2. Click on the First Time User? Click Here link in the Sign In box. You will see the New Member Sign Up page. 3. Enter your [employer] Access Code exactly as it appears below.  You will not need to use this code after youve completed the sign-up process. If you do not sign up before the expiration date, you must request a new code. · [employer] Access Code: [email or web address] Expires: 4/26/2018  2:34 PM 
 
4. Enter the last four digits of your Social Security Number (xxxx) and Date of Birth (mm/dd/yyyy) as indicated and click Submit. You will be taken to the next sign-up page. 5. Create a [employer] ID. This will be your [employer] login ID and cannot be changed, so think of one that is secure and easy to remember. 6. Create a [employer] password. You can change your password at any time. 7. Enter your Password Reset Question and Answer. This can be used at a later time if you forget your password. 8. Enter your e-mail address. You will receive e-mail notification when new information is available in [address] 9. Click Sign Up. You can now view and download portions of your medical record. 10. Click the Download Summary menu link to download a portable copy of your medical information. If you have questions, please visit the Frequently Asked Questions section of the [employer] website. Remember, [employer] is NOT to be used for urgent needs. For medical emergencies, dial 911. Now available from your iPhone and Android! Please provide this summary of care documentation to your next provider. Your primary care clinician is listed as [employer] [employer]. If you have any questions after today's visit, please call [phone].

## 2018-04-19 RX ORDER — GABAPENTIN 300 MG/1
CAPSULE ORAL
Qty: 30 CAP | Refills: 3 | Status: SHIPPED | OUTPATIENT
Start: 2018-04-19 | End: 2018-07-03 | Stop reason: SDUPTHER

## 2018-06-20 RX ORDER — PANTOPRAZOLE SODIUM 40 MG/1
TABLET, DELAYED RELEASE ORAL
Qty: 30 TAB | Refills: 1 | Status: SHIPPED | OUTPATIENT
Start: 2018-06-20 | End: 2019-04-08 | Stop reason: ALTCHOICE

## 2018-07-03 RX ORDER — GABAPENTIN 300 MG/1
CAPSULE ORAL
Qty: 30 CAP | Refills: 3 | Status: SHIPPED | OUTPATIENT
Start: 2018-07-03 | End: 2018-08-27 | Stop reason: SDUPTHER

## 2018-08-27 RX ORDER — GABAPENTIN 300 MG/1
CAPSULE ORAL
Qty: 30 CAP | Refills: 3 | Status: SHIPPED | OUTPATIENT
Start: 2018-08-27 | End: 2018-10-19 | Stop reason: SDUPTHER

## 2018-10-19 RX ORDER — GABAPENTIN 300 MG/1
CAPSULE ORAL
Qty: 30 CAP | Refills: 3 | Status: SHIPPED | OUTPATIENT
Start: 2018-10-19 | End: 2018-12-10 | Stop reason: SDUPTHER

## 2018-11-19 RX ORDER — SIMVASTATIN 40 MG/1
TABLET, FILM COATED ORAL
Qty: 30 TAB | Refills: 5 | Status: SHIPPED | OUTPATIENT
Start: 2018-11-19 | End: 2019-09-16 | Stop reason: SDUPTHER

## 2018-12-10 RX ORDER — GABAPENTIN 300 MG/1
CAPSULE ORAL
Qty: 30 CAP | Refills: 3 | Status: SHIPPED | OUTPATIENT
Start: 2018-12-10 | End: 2019-01-31 | Stop reason: SDUPTHER

## 2019-01-22 RX ORDER — ACYCLOVIR 400 MG/1
TABLET ORAL
Qty: 60 TAB | Refills: 5 | Status: SHIPPED | OUTPATIENT
Start: 2019-01-22 | End: 2019-12-16 | Stop reason: SDUPTHER

## 2019-01-31 RX ORDER — GABAPENTIN 300 MG/1
CAPSULE ORAL
Qty: 30 CAP | Refills: 3 | Status: SHIPPED | OUTPATIENT
Start: 2019-01-31 | End: 2019-03-25 | Stop reason: SDUPTHER

## 2019-03-25 RX ORDER — GABAPENTIN 300 MG/1
CAPSULE ORAL
Qty: 30 CAP | Refills: 3 | Status: SHIPPED | OUTPATIENT
Start: 2019-03-25 | End: 2019-04-05 | Stop reason: SDUPTHER

## 2019-04-05 ENCOUNTER — OFFICE VISIT (OUTPATIENT)
Dept: FAMILY MEDICINE CLINIC | Facility: CLINIC | Age: 54
End: 2019-04-05

## 2019-04-05 ENCOUNTER — HOSPITAL ENCOUNTER (OUTPATIENT)
Dept: LAB | Age: 54
Discharge: HOME | End: 2019-04-05
Payer: COMMERCIAL

## 2019-04-05 VITALS
TEMPERATURE: 98.9 F | DIASTOLIC BLOOD PRESSURE: 76 MMHG | SYSTOLIC BLOOD PRESSURE: 122 MMHG | OXYGEN SATURATION: 99 % | HEART RATE: 79 BPM | HEIGHT: 67 IN | WEIGHT: 230 LBS | BODY MASS INDEX: 36.1 KG/M2 | RESPIRATION RATE: 18 BRPM

## 2019-04-05 DIAGNOSIS — Z12.31 BREAST CANCER SCREENING BY MAMMOGRAM: ICD-10-CM

## 2019-04-05 DIAGNOSIS — G25.81 RESTLESS LEG: ICD-10-CM

## 2019-04-05 DIAGNOSIS — E03.9 ACQUIRED HYPOTHYROIDISM: ICD-10-CM

## 2019-04-05 DIAGNOSIS — Z00.00 ANNUAL PHYSICAL EXAM: Primary | ICD-10-CM

## 2019-04-05 DIAGNOSIS — Z00.00 ANNUAL PHYSICAL EXAM: ICD-10-CM

## 2019-04-05 LAB
ALBUMIN SERPL-MCNC: 3.9 G/DL (ref 3.4–5)
ALBUMIN/GLOB SERPL: 1.1 {RATIO} (ref 0.8–1.7)
ALP SERPL-CCNC: 113 U/L (ref 45–117)
ALT SERPL-CCNC: 25 U/L (ref 13–56)
ANION GAP SERPL CALC-SCNC: 7 MMOL/L (ref 3–18)
AST SERPL-CCNC: 14 U/L (ref 15–37)
BASOPHILS # BLD: 0 K/UL (ref 0–0.1)
BASOPHILS NFR BLD: 0 % (ref 0–2)
BILIRUB SERPL-MCNC: 0.2 MG/DL (ref 0.2–1)
BUN SERPL-MCNC: 14 MG/DL (ref 7–18)
BUN/CREAT SERPL: 20 (ref 12–20)
CALCIUM SERPL-MCNC: 8.8 MG/DL (ref 8.5–10.1)
CHLORIDE SERPL-SCNC: 105 MMOL/L (ref 100–108)
CHOLEST SERPL-MCNC: 223 MG/DL
CO2 SERPL-SCNC: 27 MMOL/L (ref 21–32)
CREAT SERPL-MCNC: 0.71 MG/DL (ref 0.6–1.3)
DIFFERENTIAL METHOD BLD: NORMAL
EOSINOPHIL # BLD: 0.1 K/UL (ref 0–0.4)
EOSINOPHIL NFR BLD: 1 % (ref 0–5)
ERYTHROCYTE [DISTWIDTH] IN BLOOD BY AUTOMATED COUNT: 12.8 % (ref 11.6–14.5)
GLOBULIN SER CALC-MCNC: 3.6 G/DL (ref 2–4)
GLUCOSE SERPL-MCNC: 147 MG/DL (ref 74–99)
HCT VFR BLD AUTO: 43.1 % (ref 35–45)
HGB BLD-MCNC: 14.2 G/DL (ref 12–16)
LYMPHOCYTES # BLD: 2.6 K/UL (ref 0.9–3.6)
LYMPHOCYTES NFR BLD: 26 % (ref 21–52)
MCH RBC QN AUTO: 28.9 PG (ref 24–34)
MCHC RBC AUTO-ENTMCNC: 32.9 G/DL (ref 31–37)
MCV RBC AUTO: 87.8 FL (ref 74–97)
MONOCYTES # BLD: 0.5 K/UL (ref 0.05–1.2)
MONOCYTES NFR BLD: 5 % (ref 3–10)
NEUTS SEG # BLD: 6.7 K/UL (ref 1.8–8)
NEUTS SEG NFR BLD: 68 % (ref 40–73)
PLATELET # BLD AUTO: 347 K/UL (ref 135–420)
PMV BLD AUTO: 10.6 FL (ref 9.2–11.8)
POTASSIUM SERPL-SCNC: 4.4 MMOL/L (ref 3.5–5.5)
PROT SERPL-MCNC: 7.5 G/DL (ref 6.4–8.2)
RBC # BLD AUTO: 4.91 M/UL (ref 4.2–5.3)
SODIUM SERPL-SCNC: 139 MMOL/L (ref 136–145)
WBC # BLD AUTO: 10 K/UL (ref 4.6–13.2)

## 2019-04-05 PROCEDURE — 82465 ASSAY BLD/SERUM CHOLESTEROL: CPT

## 2019-04-05 PROCEDURE — 36415 COLL VENOUS BLD VENIPUNCTURE: CPT

## 2019-04-05 PROCEDURE — 80053 COMPREHEN METABOLIC PANEL: CPT

## 2019-04-05 PROCEDURE — 85025 COMPLETE CBC W/AUTO DIFF WBC: CPT

## 2019-04-05 RX ORDER — TRIAMCINOLONE ACETONIDE 1 MG/G
CREAM TOPICAL
Qty: 45 G | Refills: 0 | Status: SHIPPED | OUTPATIENT
Start: 2019-04-05

## 2019-04-05 RX ORDER — ROPINIROLE 0.25 MG/1
TABLET, FILM COATED ORAL
Qty: 30 TAB | Refills: 2 | Status: SHIPPED | OUTPATIENT
Start: 2019-04-05 | End: 2019-10-23 | Stop reason: ALTCHOICE

## 2019-04-05 RX ORDER — GABAPENTIN 300 MG/1
CAPSULE ORAL
Qty: 90 CAP | Refills: 3 | Status: SHIPPED | OUTPATIENT
Start: 2019-04-05 | End: 2019-04-08 | Stop reason: ALTCHOICE

## 2019-04-05 NOTE — PROGRESS NOTES
Chief Complaint Patient presents with  Follow Up Chronic Condition Cholesterol Burning in feet  Medication Evaluation  
  gabapentin pt is requesting more in [address]  [facility]

## 2019-04-08 RX ORDER — GABAPENTIN 300 MG/1
300 CAPSULE ORAL 3 TIMES DAILY
Qty: 90 CAP | Refills: 2
Start: 2019-04-08 | End: 2019-10-23 | Stop reason: ALTCHOICE

## 2019-04-08 NOTE — PROGRESS NOTES
The patient presents to the office today for a check up HPI The patient presents to the office today for a check up. The patient remains on Zocor for hyperlipidemia. She is doing well on the medication. The patient remains on Neurontin due to chronic low back pain. The patient has complaints of nocturnal leg movements. Review of Systems Respiratory: Negative for shortness of breath. Cardiovascular: Negative for chest pain and leg swelling. Allergies Allergen Reactions  Latex Hives  Fruit C [Ascorbic Acid (Vitamin C)] Hives Bananas, apples, watermelon Current Outpatient Medications Medication Sig Dispense Refill  gabapentin (NEURONTIN) 300 mg capsule Take 1 Cap by mouth three (3) times daily. 90 Cap 2  
 rOPINIRole (REQUIP) 0.25 mg tablet 1 tab at bedtime 30 Tab 2  
 triamcinolone acetonide (KENALOG) 0.1 % topical cream Apply twice per day as needed 45 g 0  
 acyclovir (ZOVIRAX) 400 mg tablet TAKE 1 TABLET BY MOUTH 2 TIMES A DAY 60 Tab 5  
 simvastatin (ZOCOR) 40 mg tablet TAKE 1 TABLET BY MOUTH ONCE DAILY 30 Tab 5  cetirizine (ZYRTEC) 10 mg tablet Take  by mouth.  albuterol (PROVENTIL HFA, VENTOLIN HFA, PROAIR HFA) 90 mcg/actuation inhaler Take 2 Puffs by inhalation every four (4) hours as needed for Wheezing. 1 Inhaler 0 Past Medical History:  
Diagnosis Date  Allergic rhinitis, cause unspecified Past Surgical History:  
Procedure Laterality Date  HX HYSTERECTOMY Social History Socioeconomic History  Marital status: SINGLE Spouse name: Not on file  Number of children: Not on file  Years of education: Not on file  Highest education level: Not on file Occupational History  Not on file Social Needs  Financial resource strain: Not on file  Food insecurity:  
  Worry: Not on file Inability: Not on file  Transportation needs:  
  Medical: Not on file Non-medical: Not on file Tobacco Use  Smoking status: Never Smoker  Smokeless tobacco: Never Used Substance and Sexual Activity  Alcohol use: Not on file  Drug use: No  
 Sexual activity: Yes  
  Partners: Male Lifestyle  Physical activity:  
  Days per week: Not on file Minutes per session: Not on file  Stress: Not on file Relationships  Social connections:  
  Talks on phone: Not on file Gets together: Not on file Attends Restorationism service: Not on file Active member of club or organization: Not on file Attends meetings of clubs or organizations: Not on file Relationship status: Not on file  Intimate partner violence:  
  Fear of current or ex partner: Not on file Emotionally abused: Not on file Physically abused: Not on file Forced sexual activity: Not on file Other Topics Concern  Not on file Social History Narrative  Not on file Patient does not have an advanced directive on file Visit Vitals /76 Pulse 79 Temp 98.9 °F (37.2 °C) (Tympanic) Resp 18 Ht 5' 7\" (1.702 m) Wt 230 lb (104.3 kg) SpO2 99% BMI 36.02 kg/m² Physical Exam  
No Cervical Lymphadenopathy No Supraclavicular Lymphadenopathy Thyroid is Normal 
Lungs are normal to percussion. Clear to auscultation Heart:  S1 S2 are normal, No gallops, No murmurs No Carotid Bruits Abdomen:  Normal Bowel Sounds. No tenderness. No masses. No Hepatomegaly or Splenomegaly LE:  Strong Pedal Pulses. No Edema BMI:  The patient was advised to limit calories to 1800 shirin and carbohydrates to 100 grams daily Hospital Outpatient Visit on 04/05/2019 Component Date Value Ref Range Status  WBC 04/05/2019 10.0  4.6 - 13.2 K/uL Final  
 RBC 04/05/2019 4.91  4.20 - 5.30 M/uL Final  
 HGB 04/05/2019 14.2  12.0 - 16.0 g/dL Final  
 HCT 04/05/2019 43.1  35.0 - 45.0 % Final  
 MCV 04/05/2019 87.8  74.0 - 97.0 FL Final  
 MCH 04/05/2019 28.9  24.0 - 34.0 PG Final  
  MCHC 04/05/2019 32.9  31.0 - 37.0 g/dL Final  
 RDW 04/05/2019 12.8  11.6 - 14.5 % Final  
 PLATELET 41/05/2500 733  135 - 420 K/uL Final  
 MPV 04/05/2019 10.6  9.2 - 11.8 FL Final  
 NEUTROPHILS 04/05/2019 68  40 - 73 % Final  
 LYMPHOCYTES 04/05/2019 26  21 - 52 % Final  
 MONOCYTES 04/05/2019 5  3 - 10 % Final  
 EOSINOPHILS 04/05/2019 1  0 - 5 % Final  
 BASOPHILS 04/05/2019 0  0 - 2 % Final  
 ABS. NEUTROPHILS 04/05/2019 6.7  1.8 - 8.0 K/UL Final  
 ABS. LYMPHOCYTES 04/05/2019 2.6  0.9 - 3.6 K/UL Final  
 ABS. MONOCYTES 04/05/2019 0.5  0.05 - 1.2 K/UL Final  
 ABS. EOSINOPHILS 04/05/2019 0.1  0.0 - 0.4 K/UL Final  
 ABS. BASOPHILS 04/05/2019 0.0  0.0 - 0.1 K/UL Final  
 DF 04/05/2019 AUTOMATED    Final  
 Sodium 04/05/2019 139  136 - 145 mmol/L Final  
 Potassium 04/05/2019 4.4  3.5 - 5.5 mmol/L Final  
 Chloride 04/05/2019 105  100 - 108 mmol/L Final  
 CO2 04/05/2019 27  21 - 32 mmol/L Final  
 Anion gap 04/05/2019 7  3.0 - 18 mmol/L Final  
 Glucose 04/05/2019 147* 74 - 99 mg/dL Final  
 BUN 04/05/2019 14  7.0 - 18 MG/DL Final  
 Creatinine 04/05/2019 0.71  0.6 - 1.3 MG/DL Final  
 BUN/Creatinine ratio 04/05/2019 20  12 - 20   Final  
 GFR est AA 04/05/2019 >60  >60 ml/min/1.73m2 Final  
 GFR est non-AA 04/05/2019 >60  >60 ml/min/1.73m2 Final  
 Comment: (NOTE) Estimated GFR is calculated using the Modification of Diet in Renal  
Disease (MDRD) Study equation, reported for both  [facility]) and non- Americans (GFRNA), and normalized to 1.73m2  
body surface area. The physician must decide which value applies to  
the patient. The MDRD study equation should only be used in  
individuals age 25 or older. It has not been validated for the  
following: pregnant women, patients with serious comorbid conditions,  
or on certain medications, or persons with extremes of body size,  
muscle mass, or nutritional status.  Calcium 04/05/2019 8.8  8.5 - 10.1 MG/DL Final  
 Bilirubin, total 04/05/2019 0.2  0.2 - 1.0 MG/DL Final  
 ALT (SGPT) 04/05/2019 25  13 - 56 U/L Final  
 AST (SGOT) 04/05/2019 14* 15 - 37 U/L Final  
 Alk. phosphatase 04/05/2019 113  45 - 117 U/L Final  
 Protein, total 04/05/2019 7.5  6.4 - 8.2 g/dL Final  
 Albumin 04/05/2019 3.9  3.4 - 5.0 g/dL Final  
 Globulin 04/05/2019 3.6  2.0 - 4.0 g/dL Final  
 A-G Ratio 04/05/2019 1.1  0.8 - 1.7   Final  
 Cholesterol, total 04/05/2019 223* <200 MG/DL Final  
 
 
. Results for orders placed or performed during the hospital encounter of 04/05/19 CBC WITH AUTOMATED DIFF Result Value Ref Range WBC 10.0 4.6 - 13.2 K/uL  
 RBC 4.91 4.20 - 5.30 M/uL  
 HGB 14.2 12.0 - 16.0 g/dL HCT 43.1 35.0 - 45.0 % MCV 87.8 74.0 - 97.0 FL  
 MCH 28.9 24.0 - 34.0 PG  
 MCHC 32.9 31.0 - 37.0 g/dL  
 RDW 12.8 11.6 - 14.5 % PLATELET 185 277 - 325 K/uL MPV 10.6 9.2 - 11.8 FL  
 NEUTROPHILS 68 40 - 73 % LYMPHOCYTES 26 21 - 52 % MONOCYTES 5 3 - 10 % EOSINOPHILS 1 0 - 5 % BASOPHILS 0 0 - 2 %  
 ABS. NEUTROPHILS 6.7 1.8 - 8.0 K/UL  
 ABS. LYMPHOCYTES 2.6 0.9 - 3.6 K/UL  
 ABS. MONOCYTES 0.5 0.05 - 1.2 K/UL  
 ABS. EOSINOPHILS 0.1 0.0 - 0.4 K/UL  
 ABS. BASOPHILS 0.0 0.0 - 0.1 K/UL  
 DF AUTOMATED METABOLIC PANEL, COMPREHENSIVE Result Value Ref Range Sodium 139 136 - 145 mmol/L Potassium 4.4 3.5 - 5.5 mmol/L Chloride 105 100 - 108 mmol/L  
 CO2 27 21 - 32 mmol/L Anion gap 7 3.0 - 18 mmol/L Glucose 147 (H) 74 - 99 mg/dL BUN 14 7.0 - 18 MG/DL Creatinine 0.71 0.6 - 1.3 MG/DL  
 BUN/Creatinine ratio 20 12 - 20 GFR est AA >60 >60 ml/min/1.73m2 GFR est non-AA >60 >60 ml/min/1.73m2 Calcium 8.8 8.5 - 10.1 MG/DL Bilirubin, total 0.2 0.2 - 1.0 MG/DL  
 ALT (SGPT) 25 13 - 56 U/L  
 AST (SGOT) 14 (L) 15 - 37 U/L Alk. phosphatase 113 45 - 117 U/L Protein, total 7.5 6.4 - 8.2 g/dL Albumin 3.9 3.4 - 5.0 g/dL Globulin 3.6 2.0 - 4.0 g/dL A-G Ratio 1.1 0.8 - 1.7 CHOLESTEROL, TOTAL Result Value Ref Range Cholesterol, total 223 (H) <200 MG/DL Assessment / Plan ICD-10-CM ICD-9-CM 1. Annual physical exam Z00.00 V70.0 CBC WITH AUTOMATED DIFF  
   METABOLIC PANEL, COMPREHENSIVE  
   CHOLESTEROL, TOTAL 2. Breast cancer screening by mammogram Z12.31 V76.12 [facility] MAMMO BI SCREENING INCL CAD 3. Acquired hypothyroidism E03.9 244.9 4. Restless leg G25.81 333.94 Labs ordered I advised the patient to continue good health habits 
she was advised to continue her maintenance medications Mammogram ordered I asked Cas[place]illo if she has any questions and I answered the questions. Casa Murillo states that she understands the treatment plan and agrees with the treatment plan

## 2019-06-20 ENCOUNTER — HOSPITAL ENCOUNTER (OUTPATIENT)
Dept: MAMMOGRAPHY | Age: 54
Discharge: HOME | End: 2019-06-20
Attending: INTERNAL MEDICINE
Payer: COMMERCIAL

## 2019-06-20 DIAGNOSIS — Z12.31 BREAST CANCER SCREENING BY MAMMOGRAM: ICD-10-CM

## 2019-06-20 PROCEDURE — 77067 SCR MAMMO BI INCL CAD: CPT

## 2019-06-20 PROCEDURE — 77063 BREAST TOMOSYNTHESIS BI: CPT

## 2019-08-05 DIAGNOSIS — R20.2 PARESTHESIA OF FOOT, BILATERAL: Primary | ICD-10-CM

## 2019-08-06 RX ORDER — GABAPENTIN 300 MG/1
CAPSULE ORAL
Qty: 90 CAP | Refills: 3 | Status: SHIPPED | OUTPATIENT
Start: 2019-08-06 | End: 2019-12-16 | Stop reason: SDUPTHER

## 2019-09-17 RX ORDER — SIMVASTATIN 40 MG/1
TABLET, FILM COATED ORAL
Qty: 30 TAB | Refills: 5 | Status: SHIPPED | OUTPATIENT
Start: 2019-09-17 | End: 2019-12-16 | Stop reason: SDUPTHER

## 2019-10-21 ENCOUNTER — OFFICE VISIT (OUTPATIENT)
Dept: FAMILY MEDICINE CLINIC | Facility: CLINIC | Age: 54
End: 2019-10-21

## 2019-10-21 VITALS
RESPIRATION RATE: 18 BRPM | SYSTOLIC BLOOD PRESSURE: 122 MMHG | HEIGHT: 67 IN | OXYGEN SATURATION: 96 % | DIASTOLIC BLOOD PRESSURE: 75 MMHG | WEIGHT: 226 LBS | HEART RATE: 82 BPM | TEMPERATURE: 97.8 F | BODY MASS INDEX: 35.47 KG/M2

## 2019-10-21 DIAGNOSIS — E78.00 PURE HYPERCHOLESTEROLEMIA: Primary | ICD-10-CM

## 2019-10-21 DIAGNOSIS — G25.81 RESTLESS LEG SYNDROME: ICD-10-CM

## 2019-10-21 PROBLEM — E66.01 SEVERE OBESITY (HCC): Status: ACTIVE | Noted: 2019-10-21

## 2019-10-21 RX ORDER — ALBUTEROL SULFATE 90 UG/1
2 AEROSOL, METERED RESPIRATORY (INHALATION)
Qty: 1 INHALER | Refills: 3 | Status: SHIPPED | OUTPATIENT
Start: 2019-10-21 | End: 2019-12-16 | Stop reason: SDUPTHER

## 2019-10-23 NOTE — PROGRESS NOTES
The patient presents to the office today with the chief complaint of hyperlipidemia    HPI    The patient remains on simvastatin for hyperlipidemia. Patient is tolerating medication well. The patient is due for lab work. The patient has had problems with restless leg. The patient had tried Requip but was poorly tolerant of the medication. Restless leg has been a bit quieter. ROS  Negative for chest pain, dyspnea, or lower extremity edema    Allergies   Allergen Reactions    Latex Hives    Fruit C [Ascorbic Acid (Vitamin C)] Hives     Bananas, apples, watermelon       Current Outpatient Medications   Medication Sig Dispense Refill    albuterol (PROVENTIL HFA, VENTOLIN HFA, PROAIR HFA) 90 mcg/actuation inhaler Take 2 Puffs by inhalation every four (4) hours as needed for Wheezing. 1 Inhaler 3    varicella-zoster recombinant, PF, (SHINGRIX, PF,) 50 mcg/0.5 mL susr injection 0.5 ml IM for the first dose. Repeat in 3 months 0.5 mL 1    simvastatin (ZOCOR) 40 mg tablet TAKE 1 TABLET BY MOUTH ONCE DAILY 30 Tab 5    gabapentin (NEURONTIN) 300 mg capsule TAKE 1 CAPSULE BY MOUTH 3 TIMES A DAY 90 Cap 3    triamcinolone acetonide (KENALOG) 0.1 % topical cream Apply twice per day as needed 45 g 0    acyclovir (ZOVIRAX) 400 mg tablet TAKE 1 TABLET BY MOUTH 2 TIMES A DAY 60 Tab 5    cetirizine (ZYRTEC) 10 mg tablet Take  by mouth.          Past Medical History:   Diagnosis Date    Allergic rhinitis, cause unspecified        Past Surgical History:   Procedure Laterality Date    HX HYSTERECTOMY      HX OOPHORECTOMY      1 removed       Social History     Socioeconomic History    Marital status: SINGLE     Spouse name: Not on file    Number of children: Not on file    Years of education: Not on file    Highest education level: Not on file   Occupational History    Not on file   Social Needs    Financial resource strain: Not on file    Food insecurity:     Worry: Not on file     Inability: Not on file  Transportation needs:     Medical: Not on file     Non-medical: Not on file   Tobacco Use    Smoking status: Never Smoker    Smokeless tobacco: Never Used   Substance and Sexual Activity    Alcohol use: Not on file    Drug use: No    Sexual activity: Yes     Partners: Male   Lifestyle    Physical activity:     Days per week: Not on file     Minutes per session: Not on file    Stress: Not on file   Relationships    Social connections:     Talks on phone: Not on file     Gets together: Not on file     Attends Religion service: Not on file     Active member of club or organization: Not on file     Attends meetings of clubs or organizations: Not on file     Relationship status: Not on file    Intimate partner violence:     Fear of current or ex partner: Not on file     Emotionally abused: Not on file     Physically abused: Not on file     Forced sexual activity: Not on file   Other Topics Concern    Not on file   Social History Narrative    Not on file       Patient does not have an advanced directive on file    Visit Vitals  /75 (BP 1 Location: Left arm, BP Patient Position: Sitting)   Pulse 82   Temp 97.8 °F (36.6 °C) (Oral)   Resp 18   Ht 5' 7\" (1.702 m)   Wt 226 lb (102.5 kg)   SpO2 96%   BMI 35.40 kg/m²       Physical Exam  No Cervical Lymphadenopathy  No Supraclavicular Lymphadenopathy  Thyroid is Normal  Lungs are normal to percussion. Clear to auscultation   Heart:  S1 S2 are normal, No gallops, No murmurs  No Carotid Bruits  Abdomen:  Normal Bowel Sounds. No tenderness. No masses. No Hepatomegaly or Splenomegaly  LE:  Strong Pedal Pulses. No Edema      BMI: The BMI is high. Patient was advised to limit fat intake to 20% of calories and no more than 60 g daily    No visits with results within 3 Month(s) from this visit.    Latest known visit with results is:   Hospital Outpatient Visit on 04/05/2019   Component Date Value Ref Range Status    WBC 04/05/2019 10.0  4.6 - 13.2 K/uL Final  RBC 04/05/2019 4.91  4.20 - 5.30 M/uL Final    HGB 04/05/2019 14.2  12.0 - 16.0 g/dL Final    HCT 04/05/2019 43.1  35.0 - 45.0 % Final    MCV 04/05/2019 87.8  74.0 - 97.0 FL Final    MCH 04/05/2019 28.9  24.0 - 34.0 PG Final    MCHC 04/05/2019 32.9  31.0 - 37.0 g/dL Final    RDW 04/05/2019 12.8  11.6 - 14.5 % Final    PLATELET 24/32/2254 660  135 - 420 K/uL Final    MPV 04/05/2019 10.6  9.2 - 11.8 FL Final    NEUTROPHILS 04/05/2019 68  40 - 73 % Final    LYMPHOCYTES 04/05/2019 26  21 - 52 % Final    MONOCYTES 04/05/2019 5  3 - 10 % Final    EOSINOPHILS 04/05/2019 1  0 - 5 % Final    BASOPHILS 04/05/2019 0  0 - 2 % Final    ABS. NEUTROPHILS 04/05/2019 6.7  1.8 - 8.0 K/UL Final    ABS. LYMPHOCYTES 04/05/2019 2.6  0.9 - 3.6 K/UL Final    ABS. MONOCYTES 04/05/2019 0.5  0.05 - 1.2 K/UL Final    ABS. EOSINOPHILS 04/05/2019 0.1  0.0 - 0.4 K/UL Final    ABS. BASOPHILS 04/05/2019 0.0  0.0 - 0.1 K/UL Final    DF 04/05/2019 AUTOMATED    Final    Sodium 04/05/2019 139  136 - 145 mmol/L Final    Potassium 04/05/2019 4.4  3.5 - 5.5 mmol/L Final    Chloride 04/05/2019 105  100 - 108 mmol/L Final    CO2 04/05/2019 27  21 - 32 mmol/L Final    Anion gap 04/05/2019 7  3.0 - 18 mmol/L Final    Glucose 04/05/2019 147* 74 - 99 mg/dL Final    BUN 04/05/2019 14  7.0 - 18 MG/DL Final    Creatinine 04/05/2019 0.71  0.6 - 1.3 MG/DL Final    BUN/Creatinine ratio 04/05/2019 20  12 - 20   Final    GFR est AA 04/05/2019 >60  >60 ml/min/1.73m2 Final    GFR est non-AA 04/05/2019 >60  >60 ml/min/1.73m2 Final    Comment: (NOTE)  Estimated GFR is calculated using the Modification of Diet in Renal   Disease (MDRD) Study equation, reported for both  Americans   (GFRAA) and non- Americans (GFRNA), and normalized to 1.73m2   body surface area. The physician must decide which value applies to   the patient. The MDRD study equation should only be used in   individuals age 25 or older.  It has not been validated for the   following: pregnant women, patients with serious comorbid conditions,   or on certain medications, or persons with extremes of body size,   muscle mass, or nutritional status.  Calcium 04/05/2019 8.8  8.5 - 10.1 MG/DL Final    Bilirubin, total 04/05/2019 0.2  0.2 - 1.0 MG/DL Final    ALT (SGPT) 04/05/2019 25  13 - 56 U/L Final    AST (SGOT) 04/05/2019 14* 15 - 37 U/L Final    Alk. phosphatase 04/05/2019 113  45 - 117 U/L Final    Protein, total 04/05/2019 7.5  6.4 - 8.2 g/dL Final    Albumin 04/05/2019 3.9  3.4 - 5.0 g/dL Final    Globulin 04/05/2019 3.6  2.0 - 4.0 g/dL Final    A-G Ratio 04/05/2019 1.1  0.8 - 1.7   Final    Cholesterol, total 04/05/2019 223* <200 MG/DL Final       .No results found for any visits on 10/21/19. Assessment / Plan      ICD-10-CM ICD-9-CM    1. Pure hypercholesterolemia E78.00 272.0 LIPID PANEL      TSH 3RD GENERATION   2. Restless leg syndrome G25.81 333.94 CBC WITH AUTOMATED DIFF      METABOLIC PANEL, COMPREHENSIVE       Labs were ordered  she was advised to continue her maintenance medications      Follow-up and Dispositions    · Return in about 6 months (around 4/21/2020). I asked [place] Lover if she has any questions and I answered the questions. [place] L[place] states that she understands the treatment plan and agrees with the treatment plan          THIS DOCUMENT WAS CREATED WITH A VOICE ACTIVATED DICTATION SYSTEM.   IT MAY CONTAIN TRANSCRIPTION ERRORS

## 2019-12-16 DIAGNOSIS — R20.2 PARESTHESIA OF FOOT, BILATERAL: ICD-10-CM

## 2019-12-16 RX ORDER — ACYCLOVIR 400 MG/1
TABLET ORAL
Qty: 60 TAB | Refills: 5 | Status: SHIPPED | OUTPATIENT
Start: 2019-12-16

## 2019-12-16 RX ORDER — ALBUTEROL SULFATE 90 UG/1
2 AEROSOL, METERED RESPIRATORY (INHALATION)
Qty: 1 INHALER | Refills: 3 | Status: SHIPPED | OUTPATIENT
Start: 2019-12-16

## 2019-12-16 RX ORDER — GABAPENTIN 300 MG/1
CAPSULE ORAL
Qty: 90 CAP | Refills: 3 | Status: SHIPPED | OUTPATIENT
Start: 2019-12-16

## 2019-12-16 RX ORDER — SIMVASTATIN 40 MG/1
TABLET, FILM COATED ORAL
Qty: 30 TAB | Refills: 5 | Status: SHIPPED | OUTPATIENT
Start: 2019-12-16

## 2019-12-16 NOTE — TELEPHONE ENCOUNTER
Requested Prescriptions     Pending Prescriptions Disp Refills    gabapentin (NEURONTIN) 300 mg capsule 90 Cap 3    simvastatin (ZOCOR) 40 mg tablet 30 Tab 5    acyclovir (ZOVIRAX) 400 mg tablet 60 Tab 5    albuterol (PROVENTIL HFA, VENTOLIN HFA, PROAIR HFA) 90 mcg/actuation inhaler 1 Inhaler 3     Sig: Take 2 Puffs by inhalation every four (4) hours as needed for Wheezing.

## 2021-03-15 ENCOUNTER — TRANSCRIBE ORDER (OUTPATIENT)
Dept: SCHEDULING | Age: 56
End: 2021-03-15

## 2021-03-15 ENCOUNTER — HOSPITAL ENCOUNTER (OUTPATIENT)
Dept: MAMMOGRAPHY | Age: 56
Discharge: HOME | End: 2021-03-15
Attending: INTERNAL MEDICINE
Payer: COMMERCIAL

## 2021-03-15 DIAGNOSIS — Z12.31 VISIT FOR SCREENING MAMMOGRAM: Primary | ICD-10-CM

## 2021-03-15 DIAGNOSIS — Z12.31 VISIT FOR SCREENING MAMMOGRAM: ICD-10-CM

## 2021-03-15 PROCEDURE — 77063 BREAST TOMOSYNTHESIS BI: CPT

## 2022-03-19 PROBLEM — E04.9 ENLARGED THYROID: Status: ACTIVE | Noted: 2018-01-26

## 2022-03-19 PROBLEM — E66.01 SEVERE OBESITY (HCC): Status: ACTIVE | Noted: 2019-10-21

## 2022-03-19 PROBLEM — R20.2 PARESTHESIA OF FOOT, BILATERAL: Status: ACTIVE | Noted: 2018-01-26

## 2022-03-19 PROBLEM — R07.2 PRECORDIAL PAIN: Status: ACTIVE | Noted: 2018-01-26

## 2022-05-25 ENCOUNTER — TRANSCRIBE ORDER (OUTPATIENT)
Dept: SCHEDULING | Age: 57
End: 2022-05-25

## 2022-05-25 DIAGNOSIS — Z12.31 VISIT FOR SCREENING MAMMOGRAM: Primary | ICD-10-CM

## 2022-07-12 ENCOUNTER — HOSPITAL ENCOUNTER (OUTPATIENT)
Dept: MAMMOGRAPHY | Age: 57
Discharge: HOME | End: 2022-07-12
Attending: INTERNAL MEDICINE
Payer: COMMERCIAL

## 2022-07-12 DIAGNOSIS — Z12.31 VISIT FOR SCREENING MAMMOGRAM: ICD-10-CM

## 2022-07-12 PROCEDURE — 77063 BREAST TOMOSYNTHESIS BI: CPT

## 2023-07-14 ENCOUNTER — TRANSCRIBE ORDERS (OUTPATIENT)
Facility: HOSPITAL | Age: 58
End: 2023-07-14

## 2023-07-14 DIAGNOSIS — Z12.31 VISIT FOR SCREENING MAMMOGRAM: Primary | ICD-10-CM

## 2023-08-14 ENCOUNTER — HOSPITAL ENCOUNTER (OUTPATIENT)
Facility: HOSPITAL | Age: 58
Discharge: HOME | End: 2023-08-17
Payer: COMMERCIAL

## 2023-08-14 DIAGNOSIS — Z12.31 VISIT FOR SCREENING MAMMOGRAM: ICD-10-CM

## 2023-08-14 PROCEDURE — 77063 BREAST TOMOSYNTHESIS BI: CPT

## 2024-10-16 ENCOUNTER — TRANSCRIBE ORDERS (OUTPATIENT)
Facility: HOSPITAL | Age: 59
End: 2024-10-16

## 2024-10-16 DIAGNOSIS — Z12.31 OTHER SCREENING MAMMOGRAM: Primary | ICD-10-CM

## 2024-10-23 ENCOUNTER — HOSPITAL ENCOUNTER (OUTPATIENT)
Facility: HOSPITAL | Age: 59
Discharge: HOME | End: 2024-10-26
Payer: COMMERCIAL

## 2024-10-23 VITALS — WEIGHT: 192 LBS | HEIGHT: 68 IN | BODY MASS INDEX: 29.1 KG/M2

## 2024-10-23 DIAGNOSIS — Z12.31 OTHER SCREENING MAMMOGRAM: ICD-10-CM

## 2024-10-23 PROCEDURE — 77063 BREAST TOMOSYNTHESIS BI: CPT
